# Patient Record
Sex: FEMALE | Race: WHITE | NOT HISPANIC OR LATINO
[De-identification: names, ages, dates, MRNs, and addresses within clinical notes are randomized per-mention and may not be internally consistent; named-entity substitution may affect disease eponyms.]

---

## 2017-09-22 ENCOUNTER — TRANSCRIPTION ENCOUNTER (OUTPATIENT)
Age: 62
End: 2017-09-22

## 2021-08-26 PROBLEM — Z00.00 ENCOUNTER FOR PREVENTIVE HEALTH EXAMINATION: Status: ACTIVE | Noted: 2021-08-26

## 2021-09-01 ENCOUNTER — APPOINTMENT (OUTPATIENT)
Dept: ORTHOPEDIC SURGERY | Facility: CLINIC | Age: 66
End: 2021-09-01
Payer: COMMERCIAL

## 2021-09-01 VITALS — BODY MASS INDEX: 18.88 KG/M2 | HEIGHT: 61 IN | WEIGHT: 100 LBS

## 2021-09-01 PROCEDURE — 99203 OFFICE O/P NEW LOW 30 MIN: CPT

## 2021-09-01 NOTE — PHYSICAL EXAM
[de-identified] : Left leg/ankle:\par \par Constitutional: \par The patient is healthy-appearing and in no apparent distress. \par \par Gait and Station: \par The patient ambulates with a normal gait and no limp. \par \par Cardiovascular System: \par The capillary refill is less than 2 seconds. \par \par Skin: \par There are no skin abnormalities of ankle othet than a healed vertical laceration 2 cm along the medial tibial spine.\par \par Ankles and Feet: \par Inspection: \par There is no erythema.\par There is no induration.\par There is no warmth.\par There is no deformity.  \par There is no swelling. \par \par Bony Palpation: \par There is no tenderness of the calcaneal tuberosity.\par There is no tenderness of the metatarsals.\par There is no tenderness of the tarsometatarsal joints\par There is no tenderness of the navicular tuberosity. \par There is no tenderness of the dome of talus.\par There is no tenderness of the head of talus.\par There is no tenderness of the inferior tibiofibular joint.\par \par Soft Tissue Palpation: \par There is no tenderness of the tibialis posterior.\par There is no tenderness of the tibialis anterior. \par There is no tenderness of the plantar fascia.\par There is no tenderness of the Achilles tendon.\par There is no tenderness of the extensor hallucis longus.\par There is no tenderness of the sinus tarsi. \par There is no tenderness of the peroneus longus and brevis.\par There is no tenderness of the deltoid ligament.   \par There is no tenderness of the anterior talofibular ligament and the calcaneofibular ligament. \par \par Active Range of Motion: \par The range of motion at the ankle is full. \par \par Stability: \par The anterior drawer is negative. \par \par Strength: \par There is 5/5 ankle plantarflexion and dorsiflexion.\par \par Neurological System: \par There is normal sensation to light touch at the ankle and foot. \par \par Psychiatric: \par The patient demonstrates a normal mood and affect and is active and alert. [de-identified] : \par \par X-ray left tib-fib ( [facility] ):  There is no significant bony / soft tissue abnormality, arthritis, or fracture.

## 2021-09-01 NOTE — ASSESSMENT
[FreeTextEntry1] : Discussed at length with patient exam history and imaging and recommendation at this time for observation and discussed with patient and sometimes can take several months to fully result.  Discussed to not limit any activity and she is to follow up as needed

## 2021-09-01 NOTE — HISTORY OF PRESENT ILLNESS
[de-identified] : Location: Left shin- anterior\par Duration: 2 months\par Context: fell off elliptical and was hit in the shin 2-3 times by the moving pedal\par Quality: sharp\par Aggravating factors: walking, sitting for too long, worse at the end of the day\par Associated symptoms: initial bruising, swelling, lacerations\par Conservative treatment: rest, Ibuprofen, ice, elevation\par Prior studies: X-rays Left tib/fib [facility] 7/15/21\par States overall improved but still with some soreness mainly in the afternoon

## 2022-07-08 ENCOUNTER — NON-APPOINTMENT (OUTPATIENT)
Age: 67
End: 2022-07-08

## 2022-07-14 ENCOUNTER — APPOINTMENT (OUTPATIENT)
Dept: ORTHOPEDIC SURGERY | Facility: CLINIC | Age: 67
End: 2022-07-14

## 2022-07-14 DIAGNOSIS — S80.10XA CONTUSION OF UNSPECIFIED LOWER LEG, INITIAL ENCOUNTER: ICD-10-CM

## 2022-07-14 DIAGNOSIS — M79.662 PAIN IN LEFT LOWER LEG: ICD-10-CM

## 2022-07-14 PROCEDURE — 99213 OFFICE O/P EST LOW 20 MIN: CPT

## 2022-07-14 PROCEDURE — 73590 X-RAY EXAM OF LOWER LEG: CPT | Mod: LT

## 2022-07-14 NOTE — HISTORY OF PRESENT ILLNESS
[de-identified] : Patient is an established patient seen 10 months ago with a left anterior leg contusion.  She states overall she is felt markedly improved but she still does have intermittent discomfort in the anterior aspect of the shin which is nonlimiting. She is presenting for reevaluation

## 2022-07-14 NOTE — ASSESSMENT
[FreeTextEntry1] : Discussed at length the patient exam history current imaging and prior imaging and symptoms consistent with soft tissue contusion overlying the medial tibia.  Discussed with patient the nature of these contact contusion injuries and she may have discomfort for a long time and it has and should continue to improve.  Offered MRI evaluation the patient declines at this time

## 2022-07-14 NOTE — PHYSICAL EXAM
[de-identified] : Left leg\par \par Constitutional: \par The patient is healthy-appearing and in no apparent distress. \par \par Gait:\par The patient ambulates with a normal gait and no limp.\par \par Cardiovascular System: \par The capillary refill is less than 2 seconds. \par \par Skin: \par There are no skin abnormalities.\par \par Left Leg:\par  \par Bony Palpation: \par There is no tenderness of the medial joint line. \par There is no tenderness of the lateral joint line.\par There is no tenderness of the medial femoral chondyle.\par There is no tenderness of the lateral femoral chondyle.\par There is no tenderness of the tibial tubercle.\par There is no tenderness of the superior patella.\par There is no tenderness of the inferior patella.\par There is no tenderness of the medial patellar facet.\par There is no tenderness of the lateral patellar facet.\par There is mild tenderness of the tibia ( medial mid-tibia ).\par There is no tenderness of the calcaneus.\par There is no tenderness of the medial malleoulus.\par There is no tenderness of the fibula.\par \par Soft Tissue Palpation: \par There is no tenderness of the medial retinaculum.\par There is no tenderness of the lateral retinaculum.\par There is no tenderness of the quadriceps tendon.\par There is no tenderness of the patella tendon.\par There is no tenderness of the ITB.\par There is no tenderness of the pes anserine.\par There is no tenderness of the medial gastrocnemius.\par There is no tenderness of the lateral gastrocnemius.\par There is no tenderness of the Achilles.\par \par Active Range of Motion: \par The range of motion at the knee and ankle actively and passively is full. \par \par Special Tests: \par There is a negative Apley.\par There is a negative Steinmanns. \par There is a negative Lachman and Anterior Drawer.\par There is a negative Posterior Drawer.  \par There is no varus or valgus laxity.\par \par Strength: \par There is 5/5 hip flexion and 5/5 knee flexion and extension.  \par \par Psychiatric: \par The patient demonstrates a normal mood and affect and is active and alert [de-identified] : Given patient's reported history and physical examination, x-ray evaluation ( as listed below ) was ordered and performed to aid in diagnosis and treatment of the patient.\par X-ray left tib-fib.  There is no significant bony / soft tissue abnormality, arthritis, or fracture.\par

## 2025-08-13 ENCOUNTER — OFFICE (OUTPATIENT)
Dept: URBAN - METROPOLITAN AREA CLINIC 28 | Facility: CLINIC | Age: 70
Setting detail: OPHTHALMOLOGY
End: 2025-08-13
Payer: COMMERCIAL

## 2025-08-13 DIAGNOSIS — H52.4: ICD-10-CM

## 2025-08-13 DIAGNOSIS — H25.13: ICD-10-CM

## 2025-08-13 DIAGNOSIS — H16.221: ICD-10-CM

## 2025-08-13 DIAGNOSIS — H25.89: ICD-10-CM

## 2025-08-13 PROCEDURE — 92004 COMPRE OPH EXAM NEW PT 1/>: CPT | Performed by: OPHTHALMOLOGY

## 2025-08-13 PROCEDURE — 92015 DETERMINE REFRACTIVE STATE: CPT | Performed by: OPHTHALMOLOGY

## 2025-08-13 ASSESSMENT — REFRACTION_AUTOREFRACTION
OD_AXIS: 126
OS_CYLINDER: -1.00
OD_SPHERE: +4.00
OS_AXIS: 082
OD_CYLINDER: -1.00
OS_SPHERE: +4.25

## 2025-08-13 ASSESSMENT — TONOMETRY
OS_IOP_MMHG: 15
OD_IOP_MMHG: 15

## 2025-08-13 ASSESSMENT — KERATOMETRY
OD_K1POWER_DIOPTERS: 45.50
OS_K1POWER_DIOPTERS: 46.00
OD_K2POWER_DIOPTERS: 46.50
OD_AXISANGLE_DEGREES: 052
OS_K2POWER_DIOPTERS: 46.25
OS_AXISANGLE_DEGREES: 151

## 2025-08-13 ASSESSMENT — REFRACTION_CURRENTRX
OD_SPHERE: +5.50
OS_SPHERE: +4.75
OS_SPHERE: +3.25
OS_AXIS: 097
OS_SPHERE: +5.75
OD_SPHERE: +3.00
OD_CYLINDER: -0.75
OD_CYLINDER: -0.75
OD_OVR_VA: 20/
OD_CYLINDER: -0.75
OS_CYLINDER: -0.75
OD_SPHERE: +4.50
OS_OVR_VA: 20/
OS_CYLINDER: -0.75
OS_AXIS: 095
OS_OVR_VA: 20/
OD_OVR_VA: 20/
OD_AXIS: 108
OS_OVR_VA: 20/
OD_OVR_VA: 20/
OD_AXIS: 116
OS_CYLINDER: -0.75
OD_AXIS: 113
OS_AXIS: 088

## 2025-08-13 ASSESSMENT — REFRACTION_MANIFEST
OS_SPHERE: +3.00
OD_VA2: 20/25
OS_VA2: 20/25
OD_AXIS: 103
OD_CYLINDER: -0.75
OS_VA1: 20/25+2
OD_SPHERE: +3.25
OS_AXIS: 045
OS_CYLINDER: -0.50
OS_ADD: +2.50
OS_SPHERE: +4.50
OD_ADD: +2.50
OD_CYLINDER: -0.75
OS_CYLINDER: -0.50
OU_VA: 20/20-2
OD_VA1: 20/40+2
OD_AXIS: 105
OD_SPHERE: +4.75
OS_AXIS: 045

## 2025-08-13 ASSESSMENT — VISUAL ACUITY
OD_BCVA: 20/30-2
OS_BCVA: 20/40-1

## 2025-08-13 ASSESSMENT — SUPERFICIAL PUNCTATE KERATITIS (SPK): OD_SPK: T 1+

## 2025-08-14 ENCOUNTER — OFFICE (OUTPATIENT)
Dept: URBAN - METROPOLITAN AREA CLINIC 13 | Facility: CLINIC | Age: 70
Setting detail: OPHTHALMOLOGY
End: 2025-08-14
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
## 2025-08-14 PROCEDURE — 92015 DETERMINE REFRACTIVE STATE: CPT | Performed by: OPHTHALMOLOGY
